# Patient Record
Sex: FEMALE | Race: AMERICAN INDIAN OR ALASKA NATIVE
[De-identification: names, ages, dates, MRNs, and addresses within clinical notes are randomized per-mention and may not be internally consistent; named-entity substitution may affect disease eponyms.]

---

## 2020-09-25 ENCOUNTER — HOSPITAL ENCOUNTER (EMERGENCY)
Dept: HOSPITAL 5 - ED | Age: 30
Discharge: HOME | End: 2020-09-25
Payer: COMMERCIAL

## 2020-09-25 VITALS — SYSTOLIC BLOOD PRESSURE: 126 MMHG | DIASTOLIC BLOOD PRESSURE: 65 MMHG

## 2020-09-25 DIAGNOSIS — Z98.890: ICD-10-CM

## 2020-09-25 DIAGNOSIS — Z3A.00: ICD-10-CM

## 2020-09-25 DIAGNOSIS — Z90.89: ICD-10-CM

## 2020-09-25 DIAGNOSIS — O20.0: Primary | ICD-10-CM

## 2020-09-25 PROCEDURE — 84702 CHORIONIC GONADOTROPIN TEST: CPT

## 2020-09-25 PROCEDURE — 36415 COLL VENOUS BLD VENIPUNCTURE: CPT

## 2020-09-25 NOTE — EMERGENCY DEPARTMENT REPORT
ED Recheck HPI





- General


Chief Complaint: Recheck/Abnormal Lab/Rx


Stated Complaint: BLOOD TEST


Time Seen by Provider: 20 15:42


Source: patient


Mode of arrival: Ambulatory


Limitations: No Limitations





- History of Present Illness


Initial Comments: 





30-year-old -American female presents to the emergency room states that 

she was told to return back in 2 days after being seen on Monday to have a 

repeat hCG.  Patient presents today unable to come back on Wednesday.  Patient 

states that the bleeding has stopped and she still has some mild cramping.


MD Complaint: abnormal lab





- Related Data


                                    Allergies











Allergy/AdvReac Type Severity Reaction Status Date / Time


 


No Known Allergies Allergy   Unverified 08/15/20 10:26














ED Review of Systems


ROS: 


Stated complaint: BLOOD TEST


Other details as noted in HPI








ED Past Medical Hx





- Past Medical History


Previous Medical History?: No





- Surgical History


Past Surgical History?: Yes


Additional Surgical History: Cervical biopsy, Tonsillectomy





- Social History


Smoking Status: Never Smoker


Substance Use Type: Alcohol





ED Physical Exam





- General


Limitations: No Limitations


General appearance: alert, in no apparent distress





- Head


Head exam: Present: atraumatic, normocephalic





- ENT


ENT exam: Present: mucous membranes moist





- Neurological Exam


Neurological exam: Present: alert, oriented X3, normal gait





- Psychiatric


Psychiatric exam: Present: normal affect, normal mood





- Skin


Skin exam: Present: warm, dry, intact, normal color.  Absent: rash





ED Course


                                   Vital Signs











  20





  14:15


 


Temperature 98.7 F


 


Pulse Rate 77


 


Respiratory 14





Rate 


 


Blood Pressure 126/65


 


O2 Sat by Pulse 98





Oximetry 














ED Recheck [facility]





- Medical Decision Making





30-year-old -American female presents to the emergency room states that 

she was told to return back in 2 days after being seen on Monday to have a rep

eat hCG.  Patient presents today unable to come back on Wednesday.  Patient 

states that the bleeding has stopped and she still has some mild cramping.  

Discussed with patient that her hCG is 0.855 that she has had a spontaneous 

/miscarriage.  I recommend patient to still follow-up with a OB/GYN.


Critical care attestation.: 


If time is entered above; I have spent that time in minutes in the direct care 

of this critically ill patient, excluding procedure time.








ED Disposition


Clinical Impression: 


 Threatened 





Disposition: DC-01 TO HOME OR SELFCARE


Is pt being admited?: No


Does the pt Need Aspirin: No


Condition: Stable


Additional Instructions: 


Your hCG has come down to 0.855.  It appears that you have had a spontaneous 

/miscarriage.  I recommend to follow-up with an OB/GYN.


Referrals: 


[facility] [place] [Primary Care Provider[facility] - 3-5 Days


Forms:  Work/School Release Form(ED)

## 2021-06-03 ENCOUNTER — HOSPITAL ENCOUNTER (EMERGENCY)
Dept: HOSPITAL 5 - ED | Age: 31
Discharge: HOME | End: 2021-06-03
Payer: COMMERCIAL

## 2021-06-03 VITALS — DIASTOLIC BLOOD PRESSURE: 76 MMHG | SYSTOLIC BLOOD PRESSURE: 127 MMHG

## 2021-06-03 DIAGNOSIS — Z98.890: ICD-10-CM

## 2021-06-03 DIAGNOSIS — Z90.89: ICD-10-CM

## 2021-06-03 DIAGNOSIS — O20.9: Primary | ICD-10-CM

## 2021-06-03 LAB
ALBUMIN SERPL-MCNC: 4.2 G/DL (ref 3.9–5)
ALT SERPL-CCNC: 11 UNITS/L (ref 7–56)
BASOPHILS # (AUTO): 0 K/MM3 (ref 0–0.1)
BASOPHILS NFR BLD AUTO: 0.5 % (ref 0–1.8)
BILIRUB UR QL STRIP: (no result)
BLOOD UR QL VISUAL: (no result)
BUN SERPL-MCNC: 11 MG/DL (ref 7–17)
BUN/CREAT SERPL: 18 %
CALCIUM SERPL-MCNC: 8.9 MG/DL (ref 8.4–10.2)
EOSINOPHIL # BLD AUTO: 0.1 K/MM3 (ref 0–0.4)
EOSINOPHIL NFR BLD AUTO: 1.1 % (ref 0–4.3)
HCT VFR BLD CALC: 34.1 % (ref 30.3–42.9)
HEMOLYSIS INDEX: 2
HGB BLD-MCNC: 11.4 GM/DL (ref 10.1–14.3)
LYMPHOCYTES # BLD AUTO: 2.4 K/MM3 (ref 1.2–5.4)
LYMPHOCYTES NFR BLD AUTO: 30.7 % (ref 13.4–35)
MCHC RBC AUTO-ENTMCNC: 33 % (ref 30–34)
MCV RBC AUTO: 79 FL (ref 79–97)
MONOCYTES # (AUTO): 0.4 K/MM3 (ref 0–0.8)
MONOCYTES % (AUTO): 5.1 % (ref 0–7.3)
PH UR STRIP: 7 [PH] (ref 5–7)
PLATELET # BLD: 292 K/MM3 (ref 140–440)
PROT UR STRIP-MCNC: (no result) MG/DL
RBC # BLD AUTO: 4.35 M/MM3 (ref 3.65–5.03)
RBC #/AREA URNS HPF: 1 /HPF (ref 0–6)
UROBILINOGEN UR-MCNC: < 2 MG/DL (ref ?–2)
WBC #/AREA URNS HPF: < 1 /HPF (ref 0–6)

## 2021-06-03 PROCEDURE — 86900 BLOOD TYPING SEROLOGIC ABO: CPT

## 2021-06-03 PROCEDURE — 81001 URINALYSIS AUTO W/SCOPE: CPT

## 2021-06-03 PROCEDURE — 36415 COLL VENOUS BLD VENIPUNCTURE: CPT

## 2021-06-03 PROCEDURE — 85025 COMPLETE CBC W/AUTO DIFF WBC: CPT

## 2021-06-03 PROCEDURE — 86901 BLOOD TYPING SEROLOGIC RH(D): CPT

## 2021-06-03 PROCEDURE — 84702 CHORIONIC GONADOTROPIN TEST: CPT

## 2021-06-03 PROCEDURE — 76817 TRANSVAGINAL US OBSTETRIC: CPT

## 2021-06-03 PROCEDURE — 99284 EMERGENCY DEPT VISIT MOD MDM: CPT

## 2021-06-03 PROCEDURE — 80053 COMPREHEN METABOLIC PANEL: CPT

## 2021-06-03 PROCEDURE — 76801 OB US < 14 WKS SINGLE FETUS: CPT

## 2021-06-03 NOTE — ULTRASOUND REPORT
ULTRASOUND OBSTETRIC 



INDICATION / CLINICAL INFORMATION:

ABD PAIN IN PREG.

Clinical Gestational Age (GA): 5.4 weeks.days



TECHNIQUE:

Transabdominal and Transvaginal.



COMPARISON:

None available.



FINDINGS:

GESTATIONAL SAC: Well-defined oval shape and intrauterine in location. Mean gestational sac diameter 
measures 5.4 mm (5 weeks 2 days)

YOLK SAC: No significant abnormality.



EMBRYO/FETUS: No fetal pole is noted at this time. 



ADNEXA: Likely corpus luteal cyst noted of the right ovary measures 1.9 cm. The left ovary is unremar
kable.

FREE FLUID: None.



ADDITIONAL FINDINGS: None.



IMPRESSION:

1. Intrauterine gestational sac is noted without definitive evidence of fetal pole at this time, like
ly secondary to early dates. Ultrasound age via gestational sac diameter is 5.2 (weeks.Days). Close u
ltrasound and clinical follow-up is recommended.



Signer Name: Lemuel Kohler MD 

Signed: 6/3/2021 11:57 AM

Workstation Name: [id number]

## 2021-06-03 NOTE — EMERGENCY DEPARTMENT REPORT
<MACEY CARDONA - Last Filed: 21 12:34>





ED Female  HPI





- General


Chief complaint: Abdominal Pain


Stated complaint: PREGNANT/STOMACH PAIN/BLEEDING


Time Seen by Provider: 21 09:07


Source: patient


Mode of arrival: Ambulatory


Limitations: No Limitations





- History of Present Illness


Initial comments: 





Patient is a 30-year-old -American female that comes to the ER 

complaining of cramping as she would have during menses and vaginal bleeding.  

Patient is pregnant.  Last menstrual cycle was 2021.  





 Miscarriage 1





She has not been seen by OB/GYN.  





She states that the bleeding is light.  She denies chest pain, shortness of 

breath, vaginal discharge, dysuria or back pain.  





She is ambulatory nontoxic and non-ill-appearing on arrival to [facility]


MD Complaint: vaginal bleeding


-: Gradual, days(s)


Severity: mild


Quality: cramping


Consistency: intermittent


Improves with: none


Worsens with: none


Are you Pregnant Now?: Yes


Associated Symptoms: denies other symptoms, vaginal bleeding.  denies: vaginal 

discharge





- Related Data


                                    Allergies











Allergy/AdvReac Type Severity Reaction Status Date / Time


 


No Known Allergies Allergy   Unverified 08/15/20 10:26














ED Review of Systems


Comment: All other systems reviewed and negative





ED Past Medical Hx





- Past Medical History


Previous Medical History?: No





- Surgical History


Past Surgical History?: Yes


Additional Surgical History: Cervical biopsy, Tonsillectomy





- Family History


Family history: no significant





- Social History


Smoking Status: Never Smoker


Substance Use Type: Alcohol





ED Physical Exam





- General


Limitations: No Limitations


General appearance: alert, in no apparent distress





- Head


Head exam: Present: atraumatic, normocephalic





- Eye


Eye exam: Present: normal appearance





- ENT


ENT exam: Present: mucous membranes moist





- Neck


Neck exam: Present: normal inspection





- Respiratory


Respiratory exam: Present: normal lung sounds bilaterally.  Absent: respiratory 

distress





- Cardiovascular


Cardiovascular Exam: Present: regular rate, normal rhythm.  Absent: systolic 

murmur, diastolic murmur, rubs, gallop





- GI/Abdominal


GI/Abdominal exam: Present: soft, normal bowel sounds





- 


Speculum exam: Present: vaginal bleeding.  Absent: erythema, vaginal discharge, 

cervical discharge, foreign body, tissue, laceration


Bi-manual exam: Absent: cervical motion tendernes, adnexal tenderness, adnexal 

mass, uterine enlargement, uterine tenderness





- Extremities Exam


Extremities exam: Present: normal inspection





- Back Exam


Back exam: Present: normal inspection





- Neurological Exam


Neurological exam: Present: alert, oriented X3





- Psychiatric


Psychiatric exam: Present: normal affect, normal mood





- Skin


Skin exam: Present: warm, dry, intact, normal color.  Absent: rash





ED Medical Decision Making





- Lab Data


Result diagrams: 


                                 21 09:23





                                 21 09:23





- Radiology Data


Radiology results: report reviewed, image reviewed





see report





- Medical Decision Making








                                   Lab Results











  21 Range/Units





  09:23 09:23 09:23 


 


WBC  7.8    (4.5-11.0)  K/mm3


 


RBC  4.35    (3.65-5.03)  M/mm3


 


Hgb  11.4    (10.1-14.3)  gm/dl


 


Hct  34.1    (30.3-42.9)  %


 


MCV  79    (79-97)  fl


 


MCH  26 L    (28-32)  pg


 


MCHC  33    (30-34)  %


 


RDW  14.5    (13.2-15.2)  %


 


Plt Count  292    (140-440)  K/mm3


 


Lymph % (Auto)  30.7    (13.4-35.0)  %


 


Mono % (Auto)  5.1    (0.0-7.3)  %


 


Eos % (Auto)  1.1    (0.0-4.3)  %


 


Baso % (Auto)  0.5    (0.0-1.8)  %


 


Lymph # (Auto)  2.4    (1.2-5.4)  K/mm3


 


Mono # (Auto)  0.4    (0.0-0.8)  K/mm3


 


Eos # (Auto)  0.1    (0.0-0.4)  K/mm3


 


Baso # (Auto)  0.0    (0.0-0.1)  K/mm3


 


Seg Neutrophils %  62.6    (40.0-70.0)  %


 


Seg Neutrophils #  4.9    (1.8-7.7)  K/mm3


 


Sodium    135 L  (137-145)  mmol/L


 


Potassium    4.2  (3.6-5.0)  mmol/L


 


Chloride    101.1  ()  mmol/L


 


Carbon Dioxide    23  (22-30)  mmol/L


 


Anion Gap    15  mmol/L


 


BUN    11  (7-17)  mg/dL


 


Creatinine    0.6  (0.6-1.2)  mg/dL


 


Estimated GFR    > 60  ml/min


 


BUN/Creatinine Ratio    18  %


 


Glucose    87  ()  mg/dL


 


Calcium    8.9  (8.4-10.2)  mg/dL


 


Total Bilirubin    0.40  (0.1-1.2)  mg/dL


 


AST    12  (5-40)  units/L


 


ALT    11  (7-56)  units/L


 


Alkaline Phosphatase    54  ()  units/L


 


Total Protein    7.3  (6.3-8.2)  g/dL


 


Albumin    4.2  (3.9-5)  g/dL


 


Albumin/Globulin Ratio    1.4  %


 


HCG, Quant   1943 H   (0-4)  mIU/mL


 


Blood Type     


 


Ord Rhogam Gestat Weeks     WEEKS














  21 Range/Units





  09:23 


 


WBC   (4.5-11.0)  K/mm3


 


RBC   (3.65-5.03)  M/mm3


 


Hgb   (10.1-14.3)  gm/dl


 


Hct   (30.3-42.9)  %


 


MCV   (79-97)  fl


 


MCH   (28-32)  pg


 


MCHC   (30-34)  %


 


RDW   (13.2-15.2)  %


 


Plt Count   (140-440)  K/mm3


 


Lymph % (Auto)   (13.4-35.0)  %


 


Mono % (Auto)   (0.0-7.3)  %


 


Eos % (Auto)   (0.0-4.3)  %


 


Baso % (Auto)   (0.0-1.8)  %


 


Lymph # (Auto)   (1.2-5.4)  K/mm3


 


Mono # (Auto)   (0.0-0.8)  K/mm3


 


Eos # (Auto)   (0.0-0.4)  K/mm3


 


Baso # (Auto)   (0.0-0.1)  K/mm3


 


Seg Neutrophils %   (40.0-70.0)  %


 


Seg Neutrophils #   (1.8-7.7)  K/mm3


 


Sodium   (137-145)  mmol/L


 


Potassium   (3.6-5.0)  mmol/L


 


Chloride   ()  mmol/L


 


Carbon Dioxide   (22-30)  mmol/L


 


Anion Gap   mmol/L


 


BUN   (7-17)  mg/dL


 


Creatinine   (0.6-1.2)  mg/dL


 


Estimated GFR   ml/min


 


BUN/Creatinine Ratio   %


 


Glucose   ()  mg/dL


 


Calcium   (8.4-10.2)  mg/dL


 


Total Bilirubin   (0.1-1.2)  mg/dL


 


AST   (5-40)  units/L


 


ALT   (7-56)  units/L


 


Alkaline Phosphatase   ()  units/L


 


Total Protein   (6.3-8.2)  g/dL


 


Albumin   (3.9-5)  g/dL


 


Albumin/Globulin Ratio   %


 


HCG, Quant   (0-4)  mIU/mL


 


Blood Type  O POSITIVE  


 


Ord Rhogam Gestat Weeks  Rh pos  WEEKS











                                   Vital Signs











  21





  08:48


 


Temperature 98.2 F


 


Pulse Rate 85


 


Respiratory 17





Rate 


 


Blood Pressure 127/76





[Right] 


 


O2 Sat by Pulse 100





Oximetry 








labs noted





rh pos





us noted





ua noted





pt updated on results. 





pt being dc home with dc plan of care including obgyn follow up in 48 hours for 

reevaluation. 


She verbalizes understanding of dc plan of care 


On dc pt ambulatory, non ill, non toxic appearing. Taking PO and NAD





- Differential Diagnosis


ro ab; ectopic





ED Disposition


Clinical Impression: 


 Vaginal bleeding during pregnancy





Disposition: DC-01 TO HOME OR SELFCARE


Is pt being admited?: No


Does the pt Need Aspirin: No


Condition: Stable


Instructions:  Activity Restriction During Pregnancy, Abdominal Pain (ED)


Additional Instructions: 


pelvic rest





call Dr Palacios or your ob today and make appnt for follow up labs and imaging - 

in 48 hours





RH pos 





HCG today 





tylenol for pain 





stay well hydrated








Referrals: 


HANK PALACIOS MD [Staff Physician] - 3-5 Days


Time of Disposition: 12:24





<ANNABEL MITCHELL - Last Filed: 21 15:02>





ED Review of Systems


ROS: 


Stated complaint: PREGNANT/STOMACH PAIN/BLEEDING


Other details as noted in HPI








ED Course





                                   Vital Signs











  21





  08:48


 


Temperature 98.2 F


 


Pulse Rate 85


 


Respiratory 17





Rate 


 


Blood Pressure 127/76





[Right] 


 


O2 Sat by Pulse 100





Oximetry 














ED Medical Decision Making





- Lab Data


Result diagrams: 


                                 21 09:23





                                 21 09:23


Critical care attestation.: 


If time is entered above; I have spent that time in minutes in the direct care 

of this critically ill patient, excluding procedure time.








ED Disposition


Is pt being admited?: No


Does the pt Need Aspirin: No

## 2021-06-03 NOTE — ULTRASOUND REPORT
ULTRASOUND OBSTETRIC 



INDICATION / CLINICAL INFORMATION:

ABD PAIN IN PREG.

Clinical Gestational Age (GA): 5.4 weeks.days



TECHNIQUE:

Transabdominal and Transvaginal.



COMPARISON:

None available.



FINDINGS:

GESTATIONAL SAC: Well-defined oval shape and intrauterine in location. Mean gestational sac diameter 
measures 5.4 mm (5 weeks 2 days)

YOLK SAC: No significant abnormality.



EMBRYO/FETUS: No fetal pole is noted at this time. 



ADNEXA: Likely corpus luteal cyst noted of the right ovary measures 1.9 cm. The left ovary is unremar
kable.

FREE FLUID: None.



ADDITIONAL FINDINGS: None.



IMPRESSION:

1. Intrauterine gestational sac is noted without definitive evidence of fetal pole at this time, like
ly secondary to early dates. Ultrasound age via gestational sac diameter is 5.2 (weeks.Days). Close u
ltrasound and clinical follow-up is recommended.



Signer Name: Lemuel Kohler MD 

Signed: 6/3/2021 11:57 AM

Workstation Name: [id number]